# Patient Record
Sex: MALE | Race: WHITE | URBAN - METROPOLITAN AREA
[De-identification: names, ages, dates, MRNs, and addresses within clinical notes are randomized per-mention and may not be internally consistent; named-entity substitution may affect disease eponyms.]

---

## 2017-10-23 NOTE — TELEPHONE ENCOUNTER
"APPT INFO    Date /Time: 10/26/17, 2:25pm    Reason for Appt: Penile tingling and numbness   Ref Provider/Clinic: JEANNIE ELDER   Patient Contact (Y/N) & Call Details: No, referred    Action: Requested imaging from- [facility]     RECORDS CLINIC NAME  (\"None\" if no records ) RECEIVED RECS & IMG? Y/N   (may include other helpful notes)   [facility]         External Clinics: [facility] Received              "

## 2017-10-24 NOTE — TELEPHONE ENCOUNTER
Received Imaging From:     Image Type (x): Disc:__1___    Pacs:_____      Exam Date/Name: [facility] 10/1/14 Comments: sent to film room

## 2017-10-26 ENCOUNTER — PRE VISIT (OUTPATIENT)
Dept: NEUROLOGY | Facility: CLINIC | Age: 32
End: 2017-10-26

## 2017-10-26 ENCOUNTER — OFFICE VISIT (OUTPATIENT)
Dept: NEUROLOGY | Facility: CLINIC | Age: 32
End: 2017-10-26

## 2017-10-26 VITALS — SYSTOLIC BLOOD PRESSURE: 124 MMHG | HEIGHT: 68 IN | DIASTOLIC BLOOD PRESSURE: 70 MMHG | HEART RATE: 61 BPM

## 2017-10-26 DIAGNOSIS — N48.9 PENILE DISORDER: Primary | ICD-10-CM

## 2017-10-26 DIAGNOSIS — N48.9 PENILE DISORDER: ICD-10-CM

## 2017-10-26 LAB
ALBUMIN SERPL-MCNC: 4.7 G/DL (ref 3.4–5)
ALP SERPL-CCNC: 57 U/L (ref 40–150)
ALT SERPL W P-5'-P-CCNC: 44 U/L (ref 0–70)
AST SERPL W P-5'-P-CCNC: 27 U/L (ref 0–45)
BILIRUB DIRECT SERPL-MCNC: 0.2 MG/DL (ref 0–0.2)
BILIRUB SERPL-MCNC: 0.7 MG/DL (ref 0.2–1.3)
PROT SERPL-MCNC: 8.8 G/DL (ref 6.8–8.8)
TSH SERPL DL<=0.005 MIU/L-ACNC: 0.94 MU/L (ref 0.4–4)
VIT B12 SERPL-MCNC: 422 PG/ML (ref 193–986)

## 2017-10-26 PROCEDURE — 86618 LYME DISEASE ANTIBODY: CPT | Performed by: PSYCHIATRY & NEUROLOGY

## 2017-10-26 RX ORDER — DEXTROAMPHETAMINE SACCHARATE, AMPHETAMINE ASPARTATE, DEXTROAMPHETAMINE SULFATE AND AMPHETAMINE SULFATE 7.5; 7.5; 7.5; 7.5 MG/1; MG/1; MG/1; MG/1
TABLET ORAL
COMMUNITY
Start: 2017-09-27

## 2017-10-26 RX ORDER — DEXTROAMPHETAMINE SACCHARATE, AMPHETAMINE ASPARTATE, DEXTROAMPHETAMINE SULFATE AND AMPHETAMINE SULFATE 5; 5; 5; 5 MG/1; MG/1; MG/1; MG/1
TABLET ORAL
COMMUNITY
Start: 2017-09-27

## 2017-10-26 ASSESSMENT — PAIN SCALES - GENERAL: PAINLEVEL: MODERATE PAIN (4)

## 2017-10-26 NOTE — PROGRESS NOTES
"  DEPARTMENT OF NEUROLOGY  Referral for: penile sensory changes  Patient Name:  Judd Roberts  MRN:  [id number]  :  1985  Date of Clinic Visit:  2017  Primary Care Provider:  No primary care provider on file.  Provider Requesting Consult: Andre Roberts    CHIEF COMPLAINT: penis numbness    HISTORY OF PRESENT ILLNESS:  Judd Roberts is a 32 year old male w/ pancreatitis x2, presenting with penis numbness. He first had decrease in the penis sensation along the glands and shaft after getting chiropractic manipulation 2 years ago after a back injury. The numbness is greatest in the glans, less so on the shaft, and is least severe in the scrotum. There is a nonpainful paresthesia associated with this. He has no issues with erection maintenance at this time, he even reports getting his morning erections back.   This is after a period of erectile dysfunction somewhere between 2 and 2-1/2 years ago. He says that he had the erectile dysfunction initially after taking ciprofloxacin for one month to treat prostatitis. The erectile dysfunction resolved but soon afterward he described a change in his semen, saying that it lookd like \"sludge\" and was \"chunky\" when he ejaculated. There was no spontaneous discharge from his penis at that time. 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 This feeling of his testicles being sclerosed is intermittent.   About 2-1/2 years ago before all of that started he had onset of scrotal pain primarily with sitting down. There was no swelling and workup including sexually transmitted infections at that time were negative. About a month later he started to get what he calls \"weak\" erections, primarily in maintaining erection. He denies any problems with orgasm or sensitivity at that time. After this is when he had the episode of prostatitis. " "Ultimately, no diagnosis was made as to the cause of his sensory deficits or erectile dysfunction.   He had a MRI lumbar spine 3 years ago before the onset of the symptoms. I do not have the imaging in front of me but from the report it says there was an L4-L5 left disc protrusion on the root as well as an L5-S1 mid disc bulge.   Review of systems is broadly positive. He endorses getting a numb Botox when sitting down for 30 minutes or more. There is also associated scrotal pain, the left testicle worsen the right. He feels that his pelvic floor muscles, his \"male kegel,\" is weaker. He endorses urinary urgency and says that the neurologist has diagnosed him with a \"weak bladder.\" He does not have any bowel control issues. He describes numbness and paresthesia in his toes and occasionally the heel. He also has numbness and paresthesia in his forearms and hands worse upon waking that resolves after a few minutes. He denies nausea/vomiting, no cranial nerve dysfunction including diplopia, dysphagia, dysarthria, or changes in hearing; he also denies changes in sweating.   His father had prostate cancer but no similar symptoms. 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 He does not smoke tobacco or do drugs, he drinks ethanol rarely. There is no obvious inciting event trauma; he does not ride his bicycle for prolonged periods.    ASSESSMENT AND PLAN:  Mr. Roberts is presenting for approximately 2 years of penis numbness primarily in the S3-S4 distributions. The causes of this kind of presentation are limited. The usual causes are either local structural abnormalities or as part of a systemic process. He has a history of L5-S1 disc herniation and it's possible that this could be worsening or he's developing stenosis near the S3-S4 roots which underlie sensation of the glands and shaft of the penis. This might also explain some of his sensory complaints with respect " "to the pelvic floor. Alternatively, he has symptoms of peripheral neuropathy in his feet and forearms bilaterally. This could be a manifestation of a systemic neuropathic process or a polyradiculopathy. He has absent Achilles reflexes and sensory disturbances on the plantar aspects of his feet which support a process affecting more than the genital nerves. A testosterone issue might explain his erectile dysfunction, but this has resolved and would not explain the sensory decline. We will get imaging and functional nerve studies to better characterize the distribution and quality of neuropathy. We will also obtain some basic labs and refer him to a neuro-urologist.    Plan:  - MRI Lumbar spine w/ and w/o contrast to assess sacral nerve roots  - EMG query polyneuropathy vs radiculopathy w/ possible concomitant carpal tunnel syndrome  - neuropathy labs: OGTT, LFTs, Thyroid panel, Vitamins B1/B6/B12, Vitamin E, MMA, Lyme  - Urology referral for Dr. Juancho Sanchez for male sexual dysfunction    Patient was seen and discussed with Dr. Sen.    Armani Smith MD  Neurology PGY3  [facility]  Pager: [phone]    I saw and examined the patient and discussed the plan of care with the resident.  I agree with the findings, assessment and plan as described by the resident.    Sosa Leija MD    PHYSICAL EXAMINATION:  Vitals: /70  Pulse 61  Ht 1.727 m (5' 8\")  General: Sitting in chair, well-developed male, appears stated age  HEENT: Normocephalic, moist membranes  Cardiac: Normotensive  Pulmonary: Nonlabored on room air  Abdomen: nondistended  Extremities: Warm, no edema  Skin: No rashes, no bruising/bleeding  Psych: Cooperative, appropriate, mood and affect are congruent  Genitourinary: he is uncircumcised; there is no scrotal swelling but there is a large dark vein on the posterior aspect of the scrotum over the left testicle; there is significant reduction in sensation on the glans of the " penis, slightly more in the shaft, and increasing proximally towards the pelvis; there is also significant numbness on the most apical aspect of the medial thighs extending around the perineum to the upper aspect of the buttocks in the S2-S3 distributions.  Neuro:   Mental status: alert and oriented to person, place, and time; language is fluent with intact comprehension and naming   Cranial nerves: PERRL, EOMI with intact smooth pursuit, full visual fields, fundi within normal limits, no facial asymmetry or ptosis, facial sensation intact and symmetric, hearing intact to conversation, tongue and uvula are midline, no hypophonia, no dysarthria   Motor: No abnormal posture, tone, atrophy, or movements/fasciculations.   RIGHT LEFT   Neck flexion 5 5    5 5   FDI 5 5   2nd/3rd digit flexion 5 5   2nd/3rd digit extension 5 5   4th/5th digit flexion 5 5   4th/5th digit extension 5 5   Opponens pollicis 5 5   Biceps 5 5   Triceps 5 5   Deltoid 5 5   Hip flexion 5 5   Knee extension 5 5   Knee flexion 5 5   Dorsiflexion 5 5   Plantar flexion 5 5    Reflexes: absent Babinski. Did not test Bragg.   RIGHT LEFT   Brachioradialis 2+ 2+   Biceps 2+ 2+   Triceps 2+ 2+   Patellar 2+ 2+   Achilles absent absent    Sensory: Light touch is reduced in a gradient distally at mid shin bilaterally in the lower extremities; the plantar aspect of both feet have decreased light touch sensitivity in an unclear distribution but appears maximal and S1; vibration is 7 seconds at the toes bilaterally, 8 seconds at the ankle on the right, and 14 seconds on the left; temperature exhibits a slight reduction in a length dependent manner with the right lower extremity less sensitive than the left extremity; the left lower extremity has an area on the medial aspect of the calf with decreased pinprick sensation, otherwise there is decreased plantar pinprick sensation bilaterally; Tinel's sign absent   Coordination: No dysmetria. No  dysdiadochokinesia. No ataxia.   Gait: Normal width, stride length, turn, and arm swing.    INVESTIGATIONS: Patient had a prior MRI in October 2014. Unfortunately I cannot find these images, but I did review the report from the radiologist in CareEverywhere.    MRI Lumbar spine w/o contrast 10/1/2014:  Impression:  1. At L4-5 a shallow left paracentral broad-based disc protrusion which contacts the traversing left L5 nerve root.  2. At L5-S1 small midline disc bulge and annular tear without stenosis.  3. No evidence of intradural lesion from T11 to the sacrum.    REVIEW OF SYSTEMS: 12-point RoS negative except as per HPI.    No Known Allergies     Current Outpatient Prescriptions   Medication Sig Dispense Refill     amphetamine-dextroamphetamine (ADDERALL) 20 MG per tablet TAKE 2 TABLETS BY MOUTH EVERY MORNING ALONG WITH 30 MG TABLET AT NOON       amphetamine-dextroamphetamine (ADDERALL) 30 MG per tablet TAKE 1 TABLET BY MOUTH DAILY AT NOON       B Complex-C (SURBEX-T PO) Take 1 tablet by mouth       Multiple Vitamins-Minerals (MULTIVITAMIN & MINERAL PO) Take 1 tablet by mouth       History reviewed. No pertinent past medical history.     History reviewed. No pertinent surgical history.  Family History   Problem Relation Age of Onset     Prostate Cancer Father      Social History     Social History     Marital status: Single     Spouse name: N/A     Number of children: N/A     Years of education: N/A     Occupational History     Not on file.     Social History Main Topics     Smoking status: Never Smoker     Smokeless tobacco: Never Used     Alcohol use No     Drug use: Not on file     Sexual activity: Not on file     Other Topics Concern     Not on file     Social History Narrative

## 2017-10-26 NOTE — MR AVS SNAPSHOT
After Visit Summary   10/26/2017    Judd Roberts    MRN: [id number]           Patient Information     Date Of Birth          1985        Visit Information        Provider Department      10/26/2017 2:25 PM Armani Smith MD [facility] Neurology        Today's Diagnoses     Penile disorder    -  1       Follow-ups after your visit        Additional Services     ENDOCRINOLOGY ADULT REFERRAL       Schedule patient specifically with Dr. Jere Yuan - thank you  Endocrinology and Diabetes Clinic [facility] [phone]   [email or web address]      Please be aware that coverage of these services is subject to the terms and limitations of your health insurance plan.  Call member services at your health plan with any benefit or coverage questions.      Please bring the following to your appointment:    >>   Any x-rays, CTs or MRIs which have been performed.  Contact the facility where they were done to arrange for  prior to your scheduled appointment.    >>   List of current medications   >>   This referral request   >>   Any documents/labs given to you for this referral            UROLOGY ADULT REFERRAL       Your provider has referred you to: Dr. Juancho Sanchez at [facility] for Male Sexual Dysfunction, numbness in glans, penis shaft, and scrotum.    Please be aware that coverage of these services is subject to the terms and limitations of your health insurance plan.  Call member services at your health plan with any benefit or coverage questions.      Please bring the following with you to your appointment:    (1) Any X-Rays, CTs or MRIs which have been performed.  Contact the facility where they were done to arrange for  prior to your scheduled appointment.    (2) List of current medications  (3) This referral request   (4) Any documents/labs given to you for this referral                  Your next 10 appointments already  scheduled     Oct 26, 2017  4:15 PM CDT   LAB with  LAB   [facility] ([facility])    [address] [place] [id number] [phone]           Patient must bring picture ID. Patient should be prepared to give a urine specimen  Please do not eat 10-12 hours before your appointment if you are coming in fasting for labs on lipids, cholesterol, or glucose (sugar). Pregnant women should follow their Care Team instructions. Water with medications is okay. Do not drink coffee or other fluids. If you have concerns about taking  your medications, please ask at office or if scheduling via [employer], send a message by clicking on Secure Messaging, Message Your Care Team.            Jan 04, 2018 12:15 PM CST   (Arrive by 12:00 PM)   MR LUMBAR SPINE W/O & W CONTRAST with [address] MRI ([facility])    [address] [place] [id number] [phone]           Take your medicines as usual, unless your doctor tells you not to. Bring a list of your current medicines to your exam (including vitamins, minerals and over-the-counter drugs).  You will be given intravenous contrast for this exam. To prepare:   The day before your exam, drink extra fluids at least six 8-ounce glasses (unless your doctor tells you to restrict your fluids).   Have a blood test (creatinine test) within 30 days of your exam. Go to your clinic or Diagnostic Imaging Department for this test.  The MRI machine uses a strong magnet. Please wear clothes without metal (snaps, zippers). A sweatsuit works well, or we may give you a hospital gown.  Please remove any body piercings and hair extensions before you arrive. You will also remove watches, jewelry, hairpins, wallets, dentures, partial dental plates and hearing aids. You may wear contact lenses, and you may be able to wear your rings. We have a safe place to keep your personal items, but  it is safer to leave them at home.   **IMPORTANT** THE INSTRUCTIONS BELOW ARE ONLY FOR THOSE PATIENTS WHO HAVE BEEN TOLD THEY WILL RECEIVE SEDATION OR GENERAL ANESTHESIA DURING THEIR MRI PROCEDURE:  IF YOU WILL RECEIVE SEDATION (take medicine to help you relax during your exam):   You must get the medicine from your doctor before you arrive. Bring the medicine to the exam. Do not take it at home.   Arrive one hour early. Bring someone who can take you home after the test. Your medicine will make you sleepy. After the exam, you may not drive, take a bus or take a taxi by yourself.   No eating 8 hours before your exam. You may have clear liquids up until 4 hours before your exam. (Clear liquids include water, clear tea, black coffee and fruit juice without pulp.)  IF YOU WILL RECEIVE ANESTHESIA (be asleep for your exam):   Arrive 1 1/2 hours early. Bring someone who can take you home after the test. You may not drive, take a bus or take a taxi by yourself.   No eating 8 hours before your exam. You may have clear liquids up until 4 hours before your exam. (Clear liquids include water, clear tea, black coffee and fruit juice without pulp.)  Please call the Imaging Department at your exam site with any questions.            Jan 04, 2018  1:45 PM CST   (Arrive by 1:30 PM)   EMG with Stephen Bhandari MD   [facility] EMG ([facility])    [address] [place] [id number] [phone]           Do not use lotions or creams on the area to be tested. If you are on blood thinners (Warfarin, Coumadin, Lovenox, etc), please contact your primary care physician to check if it is safe to stop them 3 days prior to testing. If you have anxiety, please check with your referring physician to obtain anti-anxiety medication for the procedure.            Jan 04, 2018  3:20 PM CST   (Arrive by 3:05 PM)   Return Visit with Juancho Sanchez MD   [facility] Urology and [facility] for  Prostate and Urologic Cancers ([facility])    [address] [facility]  [address] [facility] [id number] [phone]              Future tests that were ordered for you today     Open Future Orders        Priority Expected Expires Ordered    MR Lumbar Spine w/o & w Contrast Routine  10/26/2018 10/26/2017    EMG Routine  10/26/2018 10/26/2017    Vitamin B1 plasma Routine  10/27/2018 10/26/2017    Vitamin B6 Routine  10/27/2018 10/26/2017    Vitamin E Routine  10/27/2018 10/26/2017    Methylmalonic acid Routine  10/27/2018 10/26/2017            Who to contact     Please call your clinic at [phone] to:    Ask questions about your health    Make or cancel appointments    Discuss your medicines    Learn about your test results    Speak to your doctor   If you have compliments or concerns about an experience at your clinic, or if you wish to file a complaint, please contact [facility] Patient Relations at [phone] or email us at [email or web address]         Additional Information About Your Visit        [employer] Information     [employer] gives you secure access to your electronic health record. If you see a primary care provider, you can also send messages to your care team and make appointments. If you have questions, please call your primary care clinic.  If you do not have a primary care provider, please call [phone] and they will assist you.      [employer] is an electronic gateway that provides easy, online access to your medical records. With [employer], you can request a clinic appointment, read your test results, renew a prescription or communicate with your care team.     To access your existing account, please contact your [facility] Clinic or call [phone] for assistance.        Care EveryWhere ID     This is your Care EveryWhere ID. This could be used by other organizations to access your [facility]  "records  [id number]        Your Vitals Were     Pulse Height                61 1.727 m (5' 8\")           Blood Pressure from Last 3 Encounters:   10/26/17 124/70   02/05/16 136/84    Weight from Last 3 Encounters:   02/05/16 87.1 kg (192 lb)              We Performed the Following     ENDOCRINOLOGY ADULT REFERRAL     Glucose Fasting and 2hr ([facility])     Hepatic panel (Albumin, ALT, AST, Bili, Alk Phos, TP)     Lyme Disease Melisa with reflex to WB Serum     TSH with free T4 reflex     UROLOGY ADULT REFERRAL     Vitamin B12        Primary Care Provider    None Specified       No primary provider on file.        Equal Access to Services     [facility]: Haddante Kang, chaz be, elijah navas, holly macdonald . So [facility] [phone].    ATENCIÓN: Si habla español, tiene a grimm disposición servicios gratuitos de asistencia lingüística. Llame al [phone].    We comply with applicable federal civil rights laws and [place] laws. We do not discriminate on the basis of race, color, national origin, age, disability, sex, sexual orientation, or gender identity.            Thank you!     Thank you for choosing [facility] NEUROLOGY  for your care. Our goal is always to provide you with excellent care. Hearing back from our patients is one way we can continue to improve our services. Please take a few minutes to complete the written survey that you may receive in the mail after your visit with us. Thank you!             Your Updated Medication List - Protect others around you: Learn how to safely use, store and throw away your medicines at www.disposemymeds.org.          This list is accurate as of: 10/26/17  4:13 PM.  Always use your most recent med list.                   Brand Name Dispense Instructions for use Diagnosis    * amphetamine-dextroamphetamine 20 MG per tablet    ADDERALL     TAKE 2 TABLETS BY MOUTH EVERY MORNING ALONG WITH 30 MG TABLET AT NOON        * " amphetamine-dextroamphetamine 30 MG per tablet    ADDERALL     TAKE 1 TABLET BY MOUTH DAILY AT NOON        MULTIVITAMIN & MINERAL PO      Take 1 tablet by mouth        SURBEX-T PO      Take 1 tablet by mouth        * Notice:  This list has 2 medication(s) that are the same as other medications prescribed for you. Read the directions carefully, and ask your doctor or other care provider to review them with you.

## 2017-10-26 NOTE — LETTER
"10/26/2017       RE: Judd Roberts  [id number] [address] [address]     Dear Colleague,    Thank you for referring your patient, Judd Roberts, to the [facility] NEUROLOGY at [facility]. Please see a copy of my visit note below.      DEPARTMENT OF NEUROLOGY  Referral for: penile sensory changes  Patient Name:  Judd Roberts  MRN:  [id number]  :  1985  Date of Clinic Visit:  2017  Primary Care Provider:  No primary care provider on file.  Provider Requesting Consult: Andre Roberts    CHIEF COMPLAINT: penis numbness    HISTORY OF PRESENT ILLNESS:  Judd Roberts is a 32 year old male w/ pancreatitis x2, presenting with penis numbness. He first had decrease in the penis sensation along the glands and shaft after getting chiropractic manipulation 2 years ago after a back injury. The numbness is greatest in the glans, less so on the shaft, and is least severe in the scrotum. There is a nonpainful paresthesia associated with this. He has no issues with erection maintenance at this time, he even reports getting his morning erections back.   This is after a period of erectile dysfunction somewhere between 2 and 2-1/2 years ago. He says that he had the erectile dysfunction initially after taking ciprofloxacin for one month to treat prostatitis. The erectile dysfunction resolved but soon afterward he described a change in his semen, saying that it lookd like \"sludge\" and was \"chunky\" when he ejaculated. There was no spontaneous discharge from his penis at that time. 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 This feeling of his testicles being sclerosed is intermittent.   About 2-1/2 years ago before all of that started he had onset of scrotal pain primarily with sitting down. There was no swelling and workup including " "sexually transmitted infections at that time were negative. About a month later he started to get what he calls \"weak\" erections, primarily in maintaining erection. He denies any problems with orgasm or sensitivity at that time. After this is when he had the episode of prostatitis. Ultimately, no diagnosis was made as to the cause of his sensory deficits or erectile dysfunction.   He had a MRI lumbar spine 3 years ago before the onset of the symptoms. I do not have the imaging in front of me but from the report it says there was an L4-L5 left disc protrusion on the root as well as an L5-S1 mid disc bulge.   Review of systems is broadly positive. He endorses getting a numb Botox when sitting down for 30 minutes or more. There is also associated scrotal pain, the left testicle worsen the right. He feels that his pelvic floor muscles, his \"male kegel,\" is weaker. He endorses urinary urgency and says that the neurologist has diagnosed him with a \"weak bladder.\" He does not have any bowel control issues. He describes numbness and paresthesia in his toes and occasionally the heel. He also has numbness and paresthesia in his forearms and hands worse upon waking that resolves after a few minutes. He denies nausea/vomiting, no cranial nerve dysfunction including diplopia, dysphagia, dysarthria, or changes in hearing; he also denies changes in sweating.   His father had prostate cancer but no similar symptoms. 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 He does not smoke tobacco or do drugs, he drinks ethanol rarely. There is no obvious inciting event trauma; he does not ride his bicycle for prolonged periods.    ASSESSMENT AND PLAN:  Mr. Roberts is presenting for approximately 2 years of penis numbness primarily in the S3-S4 distributions. The causes of this kind of presentation are limited. The usual causes are either local structural abnormalities or as part of a " "systemic process. He has a history of L5-S1 disc herniation and it's possible that this could be worsening or he's developing stenosis near the S3-S4 roots which underlie sensation of the glands and shaft of the penis. This might also explain some of his sensory complaints with respect to the pelvic floor. Alternatively, he has symptoms of peripheral neuropathy in his feet and forearms bilaterally. This could be a manifestation of a systemic neuropathic process or a polyradiculopathy. He has absent Achilles reflexes and sensory disturbances on the plantar aspects of his feet which support a process affecting more than the genital nerves. A testosterone issue might explain his erectile dysfunction, but this has resolved and would not explain the sensory decline. We will get imaging and functional nerve studies to better characterize the distribution and quality of neuropathy. We will also obtain some basic labs and refer him to a neuro-urologist.    Plan:  - MRI Lumbar spine w/ and w/o contrast to assess sacral nerve roots  - EMG query polyneuropathy vs radiculopathy w/ possible concomitant carpal tunnel syndrome  - neuropathy labs: OGTT, LFTs, Thyroid panel, Vitamins B1/B6/B12, Vitamin E, MMA, Lyme  - Urology referral for Dr. Juancho Sanchez for male sexual dysfunction    Patient was seen and discussed with Dr. Sen.    Armani Smith MD  Neurology PGY3  [facility]  Pager: [phone]    PHYSICAL EXAMINATION:  Vitals: /70  Pulse 61  Ht 1.727 m (5' 8\")  General: Sitting in chair, well-developed male, appears stated age  HEENT: Normocephalic, moist membranes  Cardiac: Normotensive  Pulmonary: Nonlabored on room air  Abdomen: nondistended  Extremities: Warm, no edema  Skin: No rashes, no bruising/bleeding  Psych: Cooperative, appropriate, mood and affect are congruent  Genitourinary: he is uncircumcised; there is no scrotal swelling but there is a large dark vein on the posterior aspect of " the scrotum over the left testicle; there is significant reduction in sensation on the glans of the penis, slightly more in the shaft, and increasing proximally towards the pelvis; there is also significant numbness on the most apical aspect of the medial thighs extending around the perineum to the upper aspect of the buttocks in the S2-S3 distributions.  Neuro:   Mental status: alert and oriented to person, place, and time; language is fluent with intact comprehension and naming   Cranial nerves: PERRL, EOMI with intact smooth pursuit, full visual fields, fundi within normal limits, no facial asymmetry or ptosis, facial sensation intact and symmetric, hearing intact to conversation, tongue and uvula are midline, no hypophonia, no dysarthria   Motor: No abnormal posture, tone, atrophy, or movements/fasciculations.   RIGHT LEFT   Neck flexion 5 5    5 5   FDI 5 5   2nd/3rd digit flexion 5 5   2nd/3rd digit extension 5 5   4th/5th digit flexion 5 5   4th/5th digit extension 5 5   Opponens pollicis 5 5   Biceps 5 5   Triceps 5 5   Deltoid 5 5   Hip flexion 5 5   Knee extension 5 5   Knee flexion 5 5   Dorsiflexion 5 5   Plantar flexion 5 5    Reflexes: absent Babinski. Did not test Bragg.   RIGHT LEFT   Brachioradialis 2+ 2+   Biceps 2+ 2+   Triceps 2+ 2+   Patellar 2+ 2+   Achilles absent absent    Sensory: Light touch is reduced in a gradient distally at mid shin bilaterally in the lower extremities; the plantar aspect of both feet have decreased light touch sensitivity in an unclear distribution but appears maximal and S1; vibration is 7 seconds at the toes bilaterally, 8 seconds at the ankle on the right, and 14 seconds on the left; temperature exhibits a slight reduction in a length dependent manner with the right lower extremity less sensitive than the left extremity; the left lower extremity has an area on the medial aspect of the calf with decreased pinprick sensation, otherwise there is decreased plantar  pinprick sensation bilaterally; Tinel's sign absent   Coordination: No dysmetria. No dysdiadochokinesia. No ataxia.   Gait: Normal width, stride length, turn, and arm swing.    INVESTIGATIONS: Patient had a prior MRI in October 2014. Unfortunately I cannot find these images, but I did review the report from the radiologist in [facility].    MRI Lumbar spine w/o contrast 10/1/2014:  Impression:  1. At L4-5 a shallow left paracentral broad-based disc protrusion which contacts the traversing left L5 nerve root.  2. At L5-S1 small midline disc bulge and annular tear without stenosis.  3. No evidence of intradural lesion from T11 to the sacrum.    REVIEW OF SYSTEMS: 12-point RoS negative except as per HPI.    No Known Allergies     Current Outpatient Prescriptions   Medication Sig Dispense Refill     amphetamine-dextroamphetamine (ADDERALL) 20 MG per tablet TAKE 2 TABLETS BY MOUTH EVERY MORNING ALONG WITH 30 MG TABLET AT NOON       amphetamine-dextroamphetamine (ADDERALL) 30 MG per tablet TAKE 1 TABLET BY MOUTH DAILY AT NOON       B Complex-C (SURBEX-T PO) Take 1 tablet by mouth       Multiple Vitamins-Minerals (MULTIVITAMIN & MINERAL PO) Take 1 tablet by mouth       History reviewed. No pertinent past medical history.     History reviewed. No pertinent surgical history.  Family History   Problem Relation Age of Onset     Prostate Cancer Father      Social History     Social History     Marital status: Single     Spouse name: N/A     Number of children: N/A     Years of education: N/A     Occupational History     Not on file.     Social History Main Topics     Smoking status: Never Smoker     Smokeless tobacco: Never Used     Alcohol use No     Drug use: Not on file     Sexual activity: Not on file     Other Topics Concern     Not on file     Social History Narrative       Again, thank you for allowing me to participate in the care of your patient.      Sincerely,    Armani Smith MD

## 2017-10-26 NOTE — NURSING NOTE
Chief Complaint   Patient presents with     Consult     Penile tingling & numbness    Juan Scott CMA

## 2017-10-27 LAB — B BURGDOR IGG+IGM SER QL: 0.11 (ref 0–0.89)

## 2017-10-29 LAB — METHYLMALONATE SERPL-SCNC: 0.14 UMOL/L (ref 0–0.4)

## 2017-10-30 LAB
A-TOCOPHEROL VIT E SERPL-MCNC: 8.8 MG/L (ref 5.5–18)
BETA+GAMMA TOCOPHEROL SERPL-MCNC: 1 MG/L (ref 0–6)

## 2017-10-31 LAB
VIT B1 SERPL-MCNC: 5 NMOL/L (ref 4–15)
VIT B6 SERPL-MCNC: 76 NMOL/L (ref 20–125)

## 2017-12-29 ENCOUNTER — PRE VISIT (OUTPATIENT)
Dept: UROLOGY | Facility: CLINIC | Age: 32
End: 2017-12-29

## 2017-12-29 NOTE — TELEPHONE ENCOUNTER
Patient with history of penile disorder coming in for consult with Dr. Sanchez. Patient chart reviewed, no need for call, all records available and ready for appointment.

## 2018-01-04 ENCOUNTER — OFFICE VISIT (OUTPATIENT)
Dept: NEUROLOGY | Facility: CLINIC | Age: 33
End: 2018-01-04
Payer: COMMERCIAL

## 2018-01-04 ENCOUNTER — RADIANT APPOINTMENT (OUTPATIENT)
Dept: MRI IMAGING | Facility: CLINIC | Age: 33
End: 2018-01-04
Attending: PSYCHIATRY & NEUROLOGY
Payer: COMMERCIAL

## 2018-01-04 ENCOUNTER — OFFICE VISIT (OUTPATIENT)
Dept: UROLOGY | Facility: CLINIC | Age: 33
End: 2018-01-04
Payer: COMMERCIAL

## 2018-01-04 VITALS
WEIGHT: 175 LBS | HEIGHT: 68 IN | SYSTOLIC BLOOD PRESSURE: 129 MMHG | DIASTOLIC BLOOD PRESSURE: 70 MMHG | HEART RATE: 50 BPM | BODY MASS INDEX: 26.52 KG/M2

## 2018-01-04 DIAGNOSIS — N48.9 PENIS DISORDER: Primary | ICD-10-CM

## 2018-01-04 DIAGNOSIS — N48.9 PENILE DISORDER: ICD-10-CM

## 2018-01-04 DIAGNOSIS — R20.2 PARESTHESIAS: Primary | ICD-10-CM

## 2018-01-04 RX ORDER — GADOBUTROL 604.72 MG/ML
7.5 INJECTION INTRAVENOUS ONCE
Status: COMPLETED | OUTPATIENT
Start: 2018-01-04 | End: 2018-01-04

## 2018-01-04 RX ADMIN — GADOBUTROL 7.5 ML: 604.72 INJECTION INTRAVENOUS at 12:50

## 2018-01-04 ASSESSMENT — PAIN SCALES - GENERAL: PAINLEVEL: NO PAIN (0)

## 2018-01-04 NOTE — LETTER
1/4/2018       RE: Judd Roberts  [id number] [address] [address]     Dear Colleague,    Thank you for referring your patient, Judd Roberts, to the [facility] EMG at [facility]. Please see a copy of my visit note below.        [facility]  Electrodiagnostic Laboratory    Nerve Conduction & EMG Report          Patient:       Judd Roberts  Patient ID:    [id number]  Gender:        Male  YOB: 1985  Age:           32 Years 2 Months      Referring Physician: Sosa Leija MD  Cc: Armani Smith MD    History & Examination:    32 year old man reporting paresthesias from his gluteal region to posterior thighs, calves, and feet, and intermittent penile paresthesias. His symptoms are reliably reproduced by prolonged sitting and relieved by standing/walking. He also report intermittent numbness especially at the ulnar aspect of both hands noted upon waking up in the morning, which typically lasts seconds to a few minutes. Query: lumbosacral radiculopathies, polyneuropathy, entrapment neuropathies of upper extremities.    Techniques: Motor and sensory conduction studies were done with surface recording electrodes. Temperature was monitored and recorded throughout the study. Upper extremities were maintained at a temperature of 32 degrees Centigrade or higher.  Lower extremities were maintained at a temperature of 31degrees Centigrade or higher. EMG was done with a concentric needle electrode.     Results:    Right median, ulnar, bilateral sural, and right superficial peroneal antidromic sensory NCSs were normal. Right median and ulnar orthodromic mixed NCSs obtained with stimulation at the palm were normal. Right median, ulnar (recorded from ADM and FDI), tibial, and bilateral deep peroneal motor NCSs were normal. Right tibial and ulnar F-wave latencies were normal. EMG of bilateral TA, medial gastrocnemius, vastus lateralis, biceps  femoris (short head) and gluteus medius muscles was normal.     Interpretation:    Normal study. No electrodiagnostic evidence of bilateral lumbosacral radiculopathies, plexopathy, polyneuropathy, right carpal tunnel syndrome, or right ulnar neuropathy, as clinically queried.    EMG Physician:    Stephen Bhandari MD       Sensory NCS      Nerve / Sites Rec. Site Onset Peak Ref. NP Amp Ref. PP Amp Dist Gurjit Ref. Temp     ms ms ms  V  V  V cm m/s m/s  C   R MEDIAN - Dig II Anti      Wrist Dig II 2.29 3.18  34.1 10.0 56.5 14 61.1 48.0 29.4   R ULNAR - Dig V Anti      Wrist Dig V 2.24 3.02  26.1 8.0 47.3 12.5 55.8 48.0 29.4   R SURAL - Lat Mall      Calf Ankle 3.13 3.75  30.9 8.0 30.0 14 44.8 38.0 28.8   L SURAL - Lat Mall      Calf Ankle 2.86 3.59  26.3 8.0 35.4 14 48.9 38.0 31.1   R SUP PERONEAL      Lat Leg Shin 2.60 3.33  19.6  21.9 12.5 48.0 38.0 28.8   R MEDIAN - Ulnar - Palmar      Median Wrist 1.41 1.82 2.40 48.7  58.5 8 56.9  32.5      Ulnar Wrist 1.41 1.93 2.40 26.5  39.2 8 56.9  32.6       Motor NCS      Nerve / Sites Rec. Site Lat Ref. Amp Ref. Rel Amp Dist Gurjit Ref. Dur. Area Temp.     ms ms mV mV % cm m/s m/s ms %  C   R MEDIAN - APB      Wrist APB 3.54 4.40 9.5 5.0 100 8   6.09 100 32.2      Elbow APB 7.19  4.1  43.6 23 63.1 48.0 6.20 39.8 31.7      stim ulnar elbow APB 8.13  1.0  10.3    4.95 1230 31.7   R ULNAR - ADM      Wrist ADM 2.86 3.50 10.4 5.0 100 8   5.63 100 30.6      B.Elbow ADM 6.67  10.2  97.8 23 60.5 48.0 5.68 107 30.7      A.Elbow ADM 8.18  10.2  98.3 9 59.6 48.0 5.63 100 30.8   R DEEP PERONEAL - EDB      Ankle EDB 4.53 6.00 8.0 2.5 100 8   6.41 100 28.9      FibHead EDB 11.25  7.2  90.5 38 56.6 38.0 7.50 110 29      Pop Fos EDB 12.55  6.5  82.1 8 61.4 38.0 7.66 106 29   L DEEP PERONEAL - EDB 60      Ankle EDB 5.36 6.00 6.2 2.0 100 8   7.08 100 31.1   R TIBIAL - AH      Ankle AH 3.54 6.00 11.6 4.0 100 8   7.03 100 28.9      Pop Fos AH 12.24  7.6  65.4 41 47.1 38.0 7.14 77.8 28.8   R  ULNAR - FDI      Wrist FDI 4.11  11.5  100    5.21 100 30.7      B.Elbow FDI 8.18  11.6  101 23 56.6  5.05 104 30.6      A.Elbow FDI 9.74  10.7  93.2 9 57.6  5.05 98.8 30.4       F  Wave      Nerve Min F Lat Max F Lat Mean FLat Temp.    ms ms ms  C   R TIBIAL 49.06 50.63 49.84 28.5   R ULNAR 25.57 28.96 28.09 30.2       EMG Summary Table     Spontaneous MUAP Recruitment    IA Fib PSW Fasc H.F. Amp Dur. PPP Pattern   R. TIB ANTERIOR N None None None None N N N N   R. GASTROCN (MED) N None None None None N N N N   R. VAST LATERALIS N None None None None N N N N   R. BIC FEM (S HEAD) N None None None None N N N N   R. GLUTEUS MED N None None None None N N N N   L. TIB ANTERIOR N None None None None N N N N   L. GASTROCN (MED) N None None None None N N N N   L. VAST LATERALIS N None None None None N N N N   L. BIC FEM (S HEAD) N None None None None N N N N   L. GLUTEUS MED N None None None None N N N N                                        Again, thank you for allowing me to participate in the care of your patient.      Sincerely,    Stephen Bhandari MD

## 2018-01-04 NOTE — PROGRESS NOTES
"We are pleased to see Mr. Judd Roberts in consultation at the request of Dr. Simth for the evaluation of chief complaint listed below    Chief Complaint:    Penile numbness         History of Present Illness:   Judd Roberts is a(n) 32 year old male w/ PMH of pancreatitis and urologic history of testicular pain and penile numbness that started roughly three years ago with an episode of acute prostatitis that manifested as bilateral testicular pain. Around that time he had a back injury - felt a \"pop\" while lifting a heavy object and pulled his back. He was treated with empiric antibiotics for the prostatitis with little symptom improvement. He then started developing bilateral testicular pain in 2014 that transformed to numbness of the penile shaft, along with weaker erections and occasional testicular numbness. He has seen 3 urologists in the past for this issue, including Dr. Luke Roberts in 2016. The first two urologists had the patient on a trial of antibiotics for presumed prostatitis (unsuccessful), did STI workup (neg) and also did a cystoscopy, which was negative. A testicular U/S in Sep 2014 was also unremarkable except for a left varicocele. Dr. Roberts saw the patient in Feb 2016 and noted that the patient had a hx of L4-L5 disk protrusion on MRI spine from Sep 2014, for which patient had been treated by a chiropractor, and noted a left varicocele on exam. He referred patient to neurology to further evaluate his spine, and they saw the patient in Oct 2017. At the time he endorsed testicular pain again also with numbness and parasthesia in the lower extremities and the penis, with otherwise normal erectons. Neurology wanted to perform imaging and functional nerve studies to further assess the patient's complaints with a repeat MRI lumbar spine. Nerve condution study was done today and was wnl. MRI spine was also done today and showed:    Impression:   1. Mild multilevel lumbar degenerative " "disease, most prominent being  the L4-5 disc degenerative disease where a herniation may abut the  left L5 nerve root within the lateral recess and recommend correlation  for left L5 radiculopathy symptoms.  2. Visualized lumbar sacral plexus down to the level of S3 appears  unremarkable however, penile innervation (given clinical history) can  involve S4, which is not covered on imaging., Also, the distal  portions of the sacral plexus are not visualized which are better  visualized on lumbar sacral plexus imaging.     Today he reports the same issues and that his penile numbness now \"involves 90% of the penis,\" especially when it's flaccid. He will also experience paraesthesias in his hands as well as his lower extremities. He has noted absence of nocturnal tumescence over the past 3 years. Denies fevers/chills/dysuria/hematuria.           Past Medical History:   History reviewed. No pertinent past medical history.         Past Surgical History:   History reviewed. No pertinent surgical history.         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         Allergies:   No Known Allergies         Medications:     Current Outpatient Prescriptions   Medication Sig     amphetamine-dextroamphetamine (ADDERALL) 20 MG per tablet TAKE 2 TABLETS BY MOUTH EVERY MORNING ALONG WITH 30 MG TABLET AT NOON     amphetamine-dextroamphetamine (ADDERALL) 30 MG per tablet TAKE 1 TABLET BY MOUTH DAILY AT NOON     B Complex-C (SURBEX-T PO) Take 1 tablet by mouth     Multiple Vitamins-Minerals (MULTIVITAMIN & MINERAL PO) Take 1 tablet by mouth     No current facility-administered medications for this visit.             REVIEW OF SYSTEMS:    See HPI for pertinent details.  Remainder of 10-point ROS negative.         " "PHYSICAL EXAM   Ht 1.727 m (5' 8\")  GENERAL: No acute distress. Well nourished.   HEENT:  Sclerae anicteric.  Conjunctivae pink.  Moist mucous membranes.  NECK:  Supple.  No lymphadenopathy.  CARDIAC:  Regular rate and rhythm.  LUNGS:  Non-labored breathing  BACK:  No costovertebral tenderness.  ABDOMEN: Soft, non-tender, no surgical scars, no organomegaly, non-tender.  :  Phallus circumcised, meatus adequate, no plaques palpated. Testes descended bilaterally, no intratesticular masses.  Epididymes non-tender.  Left varicocele noted. No inguinal hernias.  SKIN: No rashes.  Dry.     EXTREMITIES:  Warm, well perfused.  No lower extremity edema bilaterally.  NEURO: normal gait, no focal deficits.           LABS AND IMAGING:   As above          ASSESSMENT:   Judd Roberts is a 32 year old male who presents for urologic history of testicular pain and penile numbness that started roughly three years ago, at the same time of an episode of prostatitis and back injury. He has been evaluated by 3 urologists in the pasts with negative workup except for a left varicocele. He was also referred to neurology for workup of a known hx of lumbar disk protrusion. EMG and nerve conduction studies were negative today but the MRI of the spine from today did not visualize the sacral plexus down to the areas that would supply penile sensation. Exam today is unremarkable             PLAN:   - Refer back to neurology; he has a scheduled appointment with them in February. We did mention to patient that his MRI today appears to have not included the areas of the sacral plexus that would affect penile sensation, and so he could ask neurology to see if this MRI is sufficient     Ellyn Green MD MS  Urology Resident    Patient was seen and examined with Dr. Sanchez      I saw and examined the patient with the resident today.  I agree with the resident note and plan of care as above.   Unclear etiology of decreased penile sensation.  It " appears he may even have a polyneuropathy, given decreased sensations in the fingers and toes as well.  Common diagnoses such as hypothyroidism, Lyme's disease, and vitamin deficiencies have been excluded.  I advised him I do not see anything specifically wrong with the penis physically, I think this is more of a neurologic diagnosis.  He does have follow-up with neurology.  Follow-up as needed    Juancho Sanchez MD  Urology Staff

## 2018-01-04 NOTE — PROGRESS NOTES
[facility]  Electrodiagnostic Laboratory    Nerve Conduction & EMG Report          Patient:       Judd Roberts  Patient ID:    [id number]  Gender:        Male  YOB: 1985  Age:           32 Years 2 Months      Referring Physician: Sosa Leija MD  Cc: Armani Smith MD    History & Examination:    32 year old man reporting paresthesias from his gluteal region to posterior thighs, calves, and feet, and intermittent penile paresthesias. His symptoms are reliably reproduced by prolonged sitting and relieved by standing/walking. He also report intermittent numbness especially at the ulnar aspect of both hands noted upon waking up in the morning, which typically lasts seconds to a few minutes. Query: lumbosacral radiculopathies, polyneuropathy, entrapment neuropathies of upper extremities.    Techniques: Motor and sensory conduction studies were done with surface recording electrodes. Temperature was monitored and recorded throughout the study. Upper extremities were maintained at a temperature of 32 degrees Centigrade or higher.  Lower extremities were maintained at a temperature of 31degrees Centigrade or higher. EMG was done with a concentric needle electrode.     Results:    Right median, ulnar, bilateral sural, and right superficial peroneal antidromic sensory NCSs were normal. Right median and ulnar orthodromic mixed NCSs obtained with stimulation at the palm were normal. Right median, ulnar (recorded from ADM and FDI), tibial, and bilateral deep peroneal motor NCSs were normal. Right tibial and ulnar F-wave latencies were normal. EMG of bilateral TA, medial gastrocnemius, vastus lateralis, biceps femoris (short head) and gluteus medius muscles was normal.     Interpretation:    Normal study. No electrodiagnostic evidence of bilateral lumbosacral radiculopathies, plexopathy, polyneuropathy, right carpal tunnel syndrome, or right ulnar neuropathy, as clinically  queried.    EMG Physician:    Stephen Bhandari MD       Sensory NCS      Nerve / Sites Rec. Site Onset Peak Ref. NP Amp Ref. PP Amp Dist Gurjit Ref. Temp     ms ms ms  V  V  V cm m/s m/s  C   R MEDIAN - Dig II Anti      Wrist Dig II 2.29 3.18  34.1 10.0 56.5 14 61.1 48.0 29.4   R ULNAR - Dig V Anti      Wrist Dig V 2.24 3.02  26.1 8.0 47.3 12.5 55.8 48.0 29.4   R SURAL - Lat Mall      Calf Ankle 3.13 3.75  30.9 8.0 30.0 14 44.8 38.0 28.8   L SURAL - Lat Mall      Calf Ankle 2.86 3.59  26.3 8.0 35.4 14 48.9 38.0 31.1   R SUP PERONEAL      Lat Leg Shin 2.60 3.33  19.6  21.9 12.5 48.0 38.0 28.8   R MEDIAN - Ulnar - Palmar      Median Wrist 1.41 1.82 2.40 48.7  58.5 8 56.9  32.5      Ulnar Wrist 1.41 1.93 2.40 26.5  39.2 8 56.9  32.6       Motor NCS      Nerve / Sites Rec. Site Lat Ref. Amp Ref. Rel Amp Dist Gurjit Ref. Dur. Area Temp.     ms ms mV mV % cm m/s m/s ms %  C   R MEDIAN - APB      Wrist APB 3.54 4.40 9.5 5.0 100 8   6.09 100 32.2      Elbow APB 7.19  4.1  43.6 23 63.1 48.0 6.20 39.8 31.7      stim ulnar elbow APB 8.13  1.0  10.3    4.95 1230 31.7   R ULNAR - ADM      Wrist ADM 2.86 3.50 10.4 5.0 100 8   5.63 100 30.6      B.Elbow ADM 6.67  10.2  97.8 23 60.5 48.0 5.68 107 30.7      A.Elbow ADM 8.18  10.2  98.3 9 59.6 48.0 5.63 100 30.8   R DEEP PERONEAL - EDB      Ankle EDB 4.53 6.00 8.0 2.5 100 8   6.41 100 28.9      FibHead EDB 11.25  7.2  90.5 38 56.6 38.0 7.50 110 29      Pop Fos EDB 12.55  6.5  82.1 8 61.4 38.0 7.66 106 29   L DEEP PERONEAL - EDB 60      Ankle EDB 5.36 6.00 6.2 2.0 100 8   7.08 100 31.1   R TIBIAL - AH      Ankle AH 3.54 6.00 11.6 4.0 100 8   7.03 100 28.9      Pop Fos AH 12.24  7.6  65.4 41 47.1 38.0 7.14 77.8 28.8   R ULNAR - FDI      Wrist FDI 4.11  11.5  100    5.21 100 30.7      B.Elbow FDI 8.18  11.6  101 23 56.6  5.05 104 30.6      A.Elbow FDI 9.74  10.7  93.2 9 57.6  5.05 98.8 30.4       F  Wave      Nerve Min F Lat Max F Lat Mean FLat Temp.    ms ms ms  C   R TIBIAL 49.06 50.63  49.84 28.5   R ULNAR 25.57 28.96 28.09 30.2       EMG Summary Table     Spontaneous MUAP Recruitment    IA Fib PSW Fasc H.F. Amp Dur. PPP Pattern   R. TIB ANTERIOR N None None None None N N N N   R. GASTROCN (MED) N None None None None N N N N   R. VAST LATERALIS N None None None None N N N N   R. BIC FEM (S HEAD) N None None None None N N N N   R. GLUTEUS MED N None None None None N N N N   L. TIB ANTERIOR N None None None None N N N N   L. GASTROCN (MED) N None None None None N N N N   L. VAST LATERALIS N None None None None N N N N   L. BIC FEM (S HEAD) N None None None None N N N N   L. GLUTEUS MED N None None None None N N N N

## 2018-01-04 NOTE — NURSING NOTE
"Chief Complaint   Patient presents with     Consult     \"penile disorder\"       Blood pressure 129/70, pulse 50, height 1.727 m (5' 8\"), weight 79.4 kg (175 lb). Body mass index is 26.61 kg/(m^2).    There is no problem list on file for this patient.      No Known Allergies    Current Outpatient Prescriptions   Medication Sig Dispense Refill     amphetamine-dextroamphetamine (ADDERALL) 20 MG per tablet TAKE 2 TABLETS BY MOUTH EVERY MORNING ALONG WITH 30 MG TABLET AT NOON       amphetamine-dextroamphetamine (ADDERALL) 30 MG per tablet TAKE 1 TABLET BY MOUTH DAILY AT NOON       B Complex-C (SURBEX-T PO) Take 1 tablet by mouth       Multiple Vitamins-Minerals (MULTIVITAMIN & MINERAL PO) Take 1 tablet by mouth         Social History   Substance Use Topics     Smoking status: Never Smoker     Smokeless tobacco: Never Used     Alcohol use No       Jocelyn Darden LPN  1/4/2018  3:19 PM    "

## 2018-01-04 NOTE — MR AVS SNAPSHOT
After Visit Summary   1/4/2018    Judd Roberts    MRN: [id number]           Patient Information     Date Of Birth          1985        Visit Information        Provider Department      1/4/2018 3:20 PM Juancho Sanchez MD [facility] Urology and [facility] for Prostate and Urologic Cancers        Today's Diagnoses     Penis disorder    -  1       Follow-ups after your visit        Follow-up notes from your care team     Return if symptoms worsen or fail to improve.      Your next 10 appointments already scheduled     Feb 15, 2018  4:10 PM CST   (Arrive by 3:55 PM)   Return Visit with Armani Smith MD   [facility] Neurology ([facility])    [address]  3rd Floor  [place] [address] [phone]              Future tests that were ordered for you today     Open Future Orders        Priority Expected Expires Ordered    Needle EMG Each Extremity w/Paraspinal Area Complete (17128) Routine  1/4/2019 1/4/2018            Who to contact     Please call your clinic at [phone] to:    Ask questions about your health    Make or cancel appointments    Discuss your medicines    Learn about your test results    Speak to your doctor   If you have compliments or concerns about an experience at your clinic, or if you wish to file a complaint, please contact [facility] Patient Relations at [phone] or email us at [email or web address]         Additional Information About Your Visit        MyChart Information     [employer] gives you secure access to your electronic health record. If you see a primary care provider, you can also send messages to your care team and make appointments. If you have questions, please call your primary care clinic.  If you do not have a primary care provider, please call [phone] and they will assist you.      [employer] is an electronic gateway that provides easy, online access to your medical  "records. With [employer], you can request a clinic appointment, read your test results, renew a prescription or communicate with your care team.     To access your existing account, please contact your [facility] or call [phone] for assistance.        Care EveryWhere ID     This is your Care EveryWhere ID. This could be used by other organizations to access your [place] medical records  [id number]        Your Vitals Were     Pulse Height BMI (Body Mass Index)             50 1.727 m (5' 8\") 26.61 kg/m2          Blood Pressure from Last 3 Encounters:   01/04/18 129/70   10/26/17 124/70   02/05/16 136/84    Weight from Last 3 Encounters:   01/04/18 79.4 kg (175 lb)   02/05/16 87.1 kg (192 lb)              Today, you had the following     No orders found for display       Primary Care Provider    None Specified       No primary provider on file.        Equal Access to Services     [facility]: Hadii cale Kang, wasonu be, elijah kaalmada eloise, holly macdonald . So [facility] [phone].    ATENCIÓN: Si habla español, tiene a grimm disposición servicios gratuitos de asistencia lingüística. Dannaame al [phone].    We comply with applicable federal civil rights laws and [place] laws. We do not discriminate on the basis of race, color, national origin, age, disability, sex, sexual orientation, or gender identity.            Thank you!     Thank you for choosing [facility] UROLOGY AND [facility] FOR PROSTATE AND UROLOGIC CANCERS  for your care. Our goal is always to provide you with excellent care. Hearing back from our patients is one way we can continue to improve our services. Please take a few minutes to complete the written survey that you may receive in the mail after your visit with us. Thank you!             Your Updated Medication List - Protect others around you: Learn how to safely use, store and throw away your medicines at " www.disposemymeds.org.          This list is accurate as of: 1/4/18 11:59 PM.  Always use your most recent med list.                   Brand Name Dispense Instructions for use Diagnosis    * amphetamine-dextroamphetamine 20 MG per tablet    ADDERALL     TAKE 2 TABLETS BY MOUTH EVERY MORNING ALONG WITH 30 MG TABLET AT NOON        * amphetamine-dextroamphetamine 30 MG per tablet    ADDERALL     TAKE 1 TABLET BY MOUTH DAILY AT NOON        MULTIVITAMIN & MINERAL PO      Take 1 tablet by mouth        SURBEX-T PO      Take 1 tablet by mouth        * Notice:  This list has 2 medication(s) that are the same as other medications prescribed for you. Read the directions carefully, and ask your doctor or other care provider to review them with you.

## 2018-01-04 NOTE — MR AVS SNAPSHOT
After Visit Summary   1/4/2018    Judd Roberts    MRN: [id number]           Patient Information     Date Of Birth          1985        Visit Information        Provider Department      1/4/2018 1:45 PM Stephen Bhandari MD [facility] EMG        Today's Diagnoses     Paresthesias    -  1    Penile disorder           Follow-ups after your visit        Your next 10 appointments already scheduled     Jan 04, 2018  3:20 PM CST   (Arrive by 3:05 PM)   Return Visit with Juancho Sanchez MD   [facility] Urology and [facility] for Prostate and Urologic Cancers ([facility])    [address]  4th Floor  [place] [address] [phone]            Feb 15, 2018  4:10 PM CST   (Arrive by 3:55 PM)   Return Visit with Armani Smith MD   [facility] Neurology ([facility])    [address]  3rd Floor  [place] [address] [phone]              Future tests that were ordered for you today     Open Future Orders        Priority Expected Expires Ordered    Needle EMG Each Extremity w/Paraspinal Area Complete (01012) Routine  1/4/2019 1/4/2018            Who to contact     Please call your clinic at [phone] to:    Ask questions about your health    Make or cancel appointments    Discuss your medicines    Learn about your test results    Speak to your doctor   If you have compliments or concerns about an experience at your clinic, or if you wish to file a complaint, please contact [facility] Patient Relations at [phone] or email us at [email or web address]         Additional Information About Your Visit        MyChart Information     [employer] gives you secure access to your electronic health record. If you see a primary care provider, you can also send messages to your care team and make appointments. If you have questions, please call your primary care clinic.  If you do not  have a primary care provider, please call [phone] and they will assist you.      [employer] is an electronic gateway that provides easy, online access to your medical records. With [employer], you can request a clinic appointment, read your test results, renew a prescription or communicate with your care team.     To access your existing account, please contact your [facility] or call [phone] for assistance.        Care EveryWhere ID     This is your Care EveryWhere ID. This could be used by other organizations to access your [place] medical records  [id number]         Blood Pressure from Last 3 Encounters:   10/26/17 124/70   02/05/16 136/84    Weight from Last 3 Encounters:   02/05/16 87.1 kg (192 lb)              We Performed the Following     EMG     HC NCS MOTOR W OR W/O F-WAVE, 9 OR 10        Primary Care Provider    None Specified       No primary provider on file.        Equal Access to Services     [facility]: Hadii cale Kang, waaxda [place], qalorenta kaalmada eloise, holly macdonald . So [facility] [phone].    ATENCIÓN: Si habla español, tiene a grimm disposición servicios gratuitos de asistencia lingüística. Llame al [phone].    We comply with applicable federal civil rights laws and [place] laws. We do not discriminate on the basis of race, color, national origin, age, disability, sex, sexual orientation, or gender identity.            Thank you!     Thank you for choosing [facility]  for your care. Our goal is always to provide you with excellent care. Hearing back from our patients is one way we can continue to improve our services. Please take a few minutes to complete the written survey that you may receive in the mail after your visit with us. Thank you!             Your Updated Medication List - Protect others around you: Learn how to safely use, store and throw away your medicines at www.disposemymeds.org.           This list is accurate as of: 1/4/18  2:41 PM.  Always use your most recent med list.                   Brand Name Dispense Instructions for use Diagnosis    * amphetamine-dextroamphetamine 20 MG per tablet    ADDERALL     TAKE 2 TABLETS BY MOUTH EVERY MORNING ALONG WITH 30 MG TABLET AT NOON        * amphetamine-dextroamphetamine 30 MG per tablet    ADDERALL     TAKE 1 TABLET BY MOUTH DAILY AT NOON        MULTIVITAMIN & MINERAL PO      Take 1 tablet by mouth        SURBEX-T PO      Take 1 tablet by mouth        * Notice:  This list has 2 medication(s) that are the same as other medications prescribed for you. Read the directions carefully, and ask your doctor or other care provider to review them with you.

## 2018-01-04 NOTE — DISCHARGE INSTRUCTIONS
MRI Contrast Discharge Instructions    The IV contrast you received today will pass out of your body in your  urine. This will happen in the next 24 hours. You will not feel this process.  Your urine will not change color.    Drink at least 4 extra glasses of water or juice today (unless your doctor  has restricted your fluids). This reduces the stress on your kidneys.  You may take your regular medicines.    If you are on dialysis: It is best to have dialysis today.    If you have a reaction: Most reactions happen right away. If you have  any new symptoms after leaving the hospital (such as hives or swelling),  call your hospital at the correct number below. Or call your family doctor.  If you have breathing distress or wheezing, call 911.    Special instructions: ***    I have read and understand the above information.    Signature:______________________________________ Date:___________    Staff:__________________________________________ Date:___________     Time:__________    [facility] Departments:    ___[place]: [phone]  ___[facility]: [phone]  ___[place]: [phone] ___[facility]: [phone]  ___[facility]: [phone]  ___[facility]: [phone]  ___[phone]  ___[facility]: [phone]  ___[phone]

## 2018-01-12 ENCOUNTER — CARE COORDINATION (OUTPATIENT)
Dept: NEUROLOGY | Facility: CLINIC | Age: 33
End: 2018-01-12

## 2018-01-12 NOTE — PROGRESS NOTES
"Renata Balbuena RN Seeb, Tracey, RN                   Left a vm for Judd to call me back to discuss Dr. Smith's reply.            Previous Messages       ----- Message -----      From: Armani Smith MD      Sent: 1/10/2018   2:09 PM        To: MAYITO Angely - Judd's EMG was completely normal. We can repeat the MRI to capture the 1 remaining sacral level that was missed on his last MRI, but I suspect it will not be informative for his symptoms because his EMG was normal. If he wants to pursue further imaging, though, I would be happy to order another scan.     ----- Message -----      From: Renata Balbuena RN      Sent: 1/10/2018   5:55 AM        To: Armani Smith MD     Judd had his MRI done 1/4 and would like you to take a look at it.  He was told by Uro/prostate that it did not capture areas \"low enough\".  He is wondering if he should repeat MRI.  Please advise.     Thank you!   Renata            1/12/18: spoke with patient regarding Dr Smith's thoughts. He would like to hold off on further imaging at this point. He will discuss at his upcoming appointment 2/14. Patient had no further questions.  "

## 2018-02-14 ENCOUNTER — OFFICE VISIT (OUTPATIENT)
Dept: NEUROLOGY | Facility: CLINIC | Age: 33
End: 2018-02-14

## 2018-02-14 VITALS
WEIGHT: 182 LBS | HEIGHT: 68 IN | BODY MASS INDEX: 27.58 KG/M2 | OXYGEN SATURATION: 96 % | DIASTOLIC BLOOD PRESSURE: 68 MMHG | SYSTOLIC BLOOD PRESSURE: 118 MMHG | HEART RATE: 69 BPM

## 2018-02-14 DIAGNOSIS — N48.9 PENIS SYMPTOM OR SIGN: Primary | ICD-10-CM

## 2018-02-14 NOTE — PROGRESS NOTES
"  DEPARTMENT OF NEUROLOGY  Patient Name: Judd Roberts  MRN: [id number]  : 1985  Date of Clinic Visit: 2018  Primary Care Provider: No primary care provider on file.    FOLLOW-UP FOR: penis numbness    INTERVAL HISTORY:  Judd Roberts is a 32 year old male presenting for follow-up for penis numbness.   I saw Mr. Roberts in 2017.  At that time he was presenting with approximately 2 years of penis numbness primarily in the S3-S4 distributions, worse in the glans with improvement along the shaft.  He endorsed an array of concomitant symptoms such as sensation of weak bladder, weak pelvic floor muscles, and temporary erectile dysfunction.  At that visit we ordered an MRI of the lumbosacral plexus and an EMG of the nerves funded by the lumbosacral roots.  His basic neuropathy labs including LFTs, thyroid panel, vitamin B1/B6/B12/E, and MMA were all normal.   He underwent MRI of the lumbar spine with and without contrast.  Unfortunately, the images cut off at the S2 level and does not include the S3 level which is our primary area of interest.  His EMG did not demonstrate any peripheral neuropathy or radiculopathy in the lumbar or sacral areas.   Interval history: There have been no changes in his genital symptoms.  No new neurologic symptoms.  He denies chest pain, palpitations, issues controlling his bowel,  no changes in skin including atrophy or loss of hair, no flushing. He endorses intermittently sweating excessively at night, endorses frequent orthostatic hypotension.  He says that sometimes his penis will assume a \"mottled\" appearance with some pallor, suggesting a vascular issue.  He denies objective changes in temperature.      ASSESSMENT AND PLAN:  Mr. Roberts is a 32-year-old male following up for penis numbness.  Distribution is primarily the glans.  Previous workup including MRI unfortunately failed to capture the S3 level, but the lumbosacral levels to that points " "were unremarkable.  He endorses associated parasympathetic/sympathetic symptoms such as erectile dysfunction and a weak pump sensation. He feels that his problem was dismissed rather early during his Urology appointment recently, and they did not perform any functional tests.  Given his pump complaints, he needs to undergo urodynamic testing, so we'll send him back to the Urologist. He also endorses few complaints like intermittently excessive sweating as well as orthostatic hypotension and raise some concern for autonomic dysfunction.  Suspect systemic autonomic dysfunction would be very mild and we still do not have a reason why the sacral nerves to be principally affected.  While low on our differential, possibility of vasculitis is raised given his complaints of coloration changes on his shaft. We'd expect some more systemic signs/symptoms if this were vasculitic in nature, but we think it's worth a quick TAMIR screen.    Plan:  - Referral to neurology at [facility] for autonomic testing with Dr. Girard  - Referral to Urology for urodynamic testing  - TAMIR screen  - Return to clinic after autonomic testing    Patient was seen and discussed with Dr. Dietz.    Armani Smith MD  Neurology PGY3  [facility]  Pager: [phone]     I saw and examined this patient, and have read and edited the resident's note.  I agree with the findings, assessment, and plan.    Stephane Dietz  Staff Neurologist   02/15/18           PHYSICAL EXAMINATION:  Vitals: /68  Pulse 69  Ht 1.727 m (5' 8\")  Wt 82.6 kg (182 lb)  SpO2 96%  BMI 27.67 kg/m2  General: NAD  Psych: cooperative, appropriate  Neuro: alert, language is fluent with intact comprehension, no facial asymmetry, no dysarthria, no hypophonia, no abnormal movements, 5/5 strength in all limbs, gait and station normal    INVESTIGATIONS:  MRI Lumbar Spine w/ and w/o contrast 1/4/2018:  Impression:   1. Mild multilevel lumbar degenerative disease, most " prominent being the L4-5 disc degenerative disease where a herniation may abut the left L5 nerve root within the lateral recess and recommend correlation for left L5 radiculopathy symptoms.  2. Visualized lumbar sacral plexus down to the level of S3 appears unremarkable however, penile innervation (given clinical history) can involve S4, which is not covered on imaging., Also, the distal portions of the sacral plexus are not visualized which are better visualized on lumbar sacral plexus imaging.    REVIEW OF SYSTEMS: 12-point RoS negative except as per HPI.    MEDICATIONS:  Current Outpatient Prescriptions   Medication Sig Dispense Refill     amphetamine-dextroamphetamine (ADDERALL) 20 MG per tablet TAKE 2 TABLETS BY MOUTH EVERY MORNING ALONG WITH 30 MG TABLET AT NOON       amphetamine-dextroamphetamine (ADDERALL) 30 MG per tablet TAKE 1 TABLET BY MOUTH DAILY AT NOON       B Complex-C (SURBEX-T PO) Take 1 tablet by mouth       Multiple Vitamins-Minerals (MULTIVITAMIN & MINERAL PO) Take 1 tablet by mouth

## 2018-02-14 NOTE — LETTER
"2018     RE: Judd Roberts  [address] [address] [address]     Dear Colleague,    Thank you for referring your patient, Judd Roberts, to the [facility] NEUROLOGY at [facility]. Please see a copy of my visit note below.      DEPARTMENT OF NEUROLOGY  Patient Name: Judd Roberts  MRN: [id number]  : 1985  Date of Clinic Visit: 2018  Primary Care Provider: No primary care provider on file.    FOLLOW-UP FOR: penis numbness    INTERVAL HISTORY:  Judd Roberts is a 32 year old male presenting for follow-up for penis numbness.   I saw Mr. Roberts in 2017.  At that time he was presenting with approximately 2 years of penis numbness primarily in the S3-S4 distributions, worse in the glans with improvement along the shaft.  He endorsed an array of concomitant symptoms such as sensation of weak bladder, weak pelvic floor muscles, and temporary erectile dysfunction.  At that visit we ordered an MRI of the lumbosacral plexus and an EMG of the nerves funded by the lumbosacral roots.  His basic neuropathy labs including LFTs, thyroid panel, vitamin B1/B6/B12/E, and MMA were all normal.   He underwent MRI of the lumbar spine with and without contrast.  Unfortunately, the images cut off at the S2 level and does not include the S3 level which is our primary area of interest.  His EMG did not demonstrate any peripheral neuropathy or radiculopathy in the lumbar or sacral areas.   Interval history: There have been no changes in his genital symptoms.  No new neurologic symptoms.  He denies chest pain, palpitations, issues controlling his bowel,  no changes in skin including atrophy or loss of hair, no flushing. He endorses intermittently sweating excessively at night, endorses frequent orthostatic hypotension.  He says that sometimes his penis will assume a \"mottled\" appearance with some pallor, suggesting a vascular issue.  He denies " "objective changes in temperature.      ASSESSMENT AND PLAN:  Mr. Roberts is a 32-year-old male following up for penis numbness.  Distribution is primarily the glans.  Previous workup including MRI unfortunately failed to capture the S3 level, but the lumbosacral levels to that points were unremarkable.  He endorses associated parasympathetic/sympathetic symptoms such as erectile dysfunction and a weak pump sensation. He feels that his problem was dismissed rather early during his Urology appointment recently, and they did not perform any functional tests.  Given his pump complaints, he needs to undergo urodynamic testing, so we'll send him back to the Urologist. He also endorses few complaints like intermittently excessive sweating as well as orthostatic hypotension and raise some concern for autonomic dysfunction.  Suspect systemic autonomic dysfunction would be very mild and we still do not have a reason why the sacral nerves to be principally affected.  While low on our differential, possibility of vasculitis is raised given his complaints of coloration changes on his shaft. We'd expect some more systemic signs/symptoms if this were vasculitic in nature, but we think it's worth a quick TAMIR screen.    Plan:  - [place] [facility] [place] with Dr. Girard  - Referral to Urology for urodynamic testing  - TAMIR screen  - Return to clinic after autonomic testing    Patient was seen and discussed with Dr. Dietz.    Armani Smith MD  Neurology PGY3  [facility]  Pager: [phone]     I saw and examined this patient, and have read and edited the resident's note.  I agree with the findings, assessment, and plan.    Stephane Dietz  Staff Neurologist   02/15/18           PHYSICAL EXAMINATION:  Vitals: /68  Pulse 69  Ht 1.727 m (5' 8\")  Wt 82.6 kg (182 lb)  SpO2 96%  BMI 27.67 kg/m2  General: NAD  Psych: cooperative, appropriate  Neuro: alert, language is fluent " with intact comprehension, no facial asymmetry, no dysarthria, no hypophonia, no abnormal movements, 5/5 strength in all limbs, gait and station normal    INVESTIGATIONS:  MRI Lumbar Spine w/ and w/o contrast 1/4/2018:  Impression:   1. Mild multilevel lumbar degenerative disease, most prominent being the L4-5 disc degenerative disease where a herniation may abut the left L5 nerve root within the lateral recess and recommend correlation for left L5 radiculopathy symptoms.  2. Visualized lumbar sacral plexus down to the level of S3 appears unremarkable however, penile innervation (given clinical history) can involve S4, which is not covered on imaging., Also, the distal portions of the sacral plexus are not visualized which are better visualized on lumbar sacral plexus imaging.    REVIEW OF SYSTEMS: 12-point RoS negative except as per HPI.    MEDICATIONS:  Current Outpatient Prescriptions   Medication Sig Dispense Refill     amphetamine-dextroamphetamine (ADDERALL) 20 MG per tablet TAKE 2 TABLETS BY MOUTH EVERY MORNING ALONG WITH 30 MG TABLET AT NOON       amphetamine-dextroamphetamine (ADDERALL) 30 MG per tablet TAKE 1 TABLET BY MOUTH DAILY AT NOON       B Complex-C (SURBEX-T PO) Take 1 tablet by mouth       Multiple Vitamins-Minerals (MULTIVITAMIN & MINERAL PO) Take 1 tablet by mouth       Again, thank you for allowing me to participate in the care of your patient.      Sincerely,    Armani Smith MD

## 2018-02-14 NOTE — MR AVS SNAPSHOT
After Visit Summary   2018    Judd Roberts    MRN: [id number]           Patient Information     Date Of Birth          1985        Visit Information        Provider Department      2018 4:10 PM Armani Smtih MD [facility] Neurology        Today's Diagnoses     Penis symptom or sign    -  1       Follow-ups after your visit        Additional Services     NEUROLOGY ADULT REFERRAL       Referral to [facility] with Dr. Girard for autonomic testing.  Patient name: Judd Roberts  : 1985  Patient history: Patient has penile numbness with weak ejaculation and micturation, and he had transient erectile dysfunction in the past. EMG of the peripheral nerves of the lumbosacral plexus was normal, as were basic blood tests. MRI of the Lumbar spine only went to S2, did not capture S3, but there were no structural abnormalities. He reports some excessive sweating and frequent orthostasis, so we'd like to assess for a possible autonomic dysfunction that could be underlying his genital issues.  Please call the patient to schedule.  Patient phone: [phone]    Please be aware that coverage of these services is subject to the terms and limitations of your health insurance plan.  Call member services at your health plan with any benefit or coverage questions.      Please bring the following with you to your appointment:    (1) Any X-Rays, CTs or MRIs which have been performed.  Contact the facility where they were done to arrange for  prior to your scheduled appointment.    (2) List of current medications  (3) This referral request   (4) Any documents/labs given to you for this referral            UROLOGY ADULT REFERRAL       Referral to Urology for URODYNAMIC TESTING.    Please be aware that coverage of these services is subject to the terms and limitations of your health insurance plan.  Call member services at your health plan with any  benefit or coverage questions.      Please bring the following with you to your appointment:    (1) Any X-Rays, CTs or MRIs which have been performed.  Contact the facility where they were done to arrange for  prior to your scheduled appointment.    (2) List of current medications  (3) This referral request   (4) Any documents/labs given to you for this referral                  Your next 10 appointments already scheduled     May 10, 2018 11:00 AM CDT   (Arrive by 10:45 AM)   Urodynamics with Erma Singh PA-C   [facility] ([facility] [place])    [address]  [address]th [address]  [facility] [place] [address] [phone]            May 10, 2018  3:40 PM CDT   (Arrive by 3:25 PM)   Return Visit with Armani Smith MD   [facility]rology ([facility])    [address]  [address] Floor  [place] [address] [phone]              Who to contact     Please call your clinic at [phone] to:    Ask questions about your health    Make or cancel appointments    Discuss your medicines    Learn about your test results    Speak to your doctor            Additional Information About Your Visit        [facility] Information     [facility] gives you secure access to your electronic health record. If you see a primary care provider, you can also send messages to your care team and make appointments. If you have questions, please call your primary care clinic.  If you do not have a primary care provider, please call [phone] and they will assist you.      [facility] is an electronic gateway that provides easy, online access to your medical records. With [facility], you can request a clinic appointment, read your test results, renew a prescription or communicate with your care team.     To access your existing account, please contact your [facility] or call [phone] for assistance.       "  Care EveryWhere ID     This is your Care EveryWhere ID. This could be used by other organizations to access your [facility] medical records  [id number]        Your Vitals Were     Pulse Height Pulse Oximetry BMI (Body Mass Index)          69 1.727 m (5' 8\") 96% 27.67 kg/m2         Blood Pressure from Last 3 Encounters:   02/14/18 118/68   01/04/18 129/70   10/26/17 124/70    Weight from Last 3 Encounters:   02/14/18 82.6 kg (182 lb)   01/04/18 79.4 kg (175 lb)   02/05/16 87.1 kg (192 lb)              We Performed the Following     TAMIR Screen I[facility] ([facility])     NEUROLOGY ADULT REFERRAL     UROLOGY ADULT REFERRAL        Primary Care Provider    None Specified       No primary provider on file.        Equal Access to Services     RAFITA PORRAS : Haddante Kang, waaxda luqadaha, qaybta kaalmada romeliayadeanna, holly macdonald . So [facility] [phone].    ATENCIÓN: Si habla español, tiene a grimm disposición servicios gratuitos de asistencia lingüística. Llame al [phone].    We comply with applicable federal civil rights laws and [place] laws. We do not discriminate on the basis of race, color, national origin, age, disability, sex, sexual orientation, or gender identity.            Thank you!     Thank you for choosing [facility] NEUROLOGY  for your care. Our goal is always to provide you with excellent care. Hearing back from our patients is one way we can continue to improve our services. Please take a few minutes to complete the written survey that you may receive in the mail after your visit with us. Thank you!             Your Updated Medication List - Protect others around you: Learn how to safely use, store and throw away your medicines at www.disposemymeds.org.          This list is accurate as of 2/14/18  5:21 PM.  Always use your most recent med list.                   Brand Name Dispense Instructions for use Diagnosis    * amphetamine-dextroamphetamine 20 MG per tablet    " ADDERALL     TAKE 2 TABLETS BY MOUTH EVERY MORNING ALONG WITH 30 MG TABLET AT NOON        * amphetamine-dextroamphetamine 30 MG per tablet    ADDERALL     TAKE 1 TABLET BY MOUTH DAILY AT NOON        MULTIVITAMIN & MINERAL PO      Take 1 tablet by mouth        SURBEX-T PO      Take 1 tablet by mouth        * Notice:  This list has 2 medication(s) that are the same as other medications prescribed for you. Read the directions carefully, and ask your doctor or other care provider to review them with you.

## 2018-02-14 NOTE — PATIENT INSTRUCTIONS
1. Make another Urology appointment for urodynamic testing.  2. Referral to [facility] Neurology clinic for autonomic testing with Dr. Girard.  3. Blood test to check TAMIR for possible vasculitis.  4. Return to Dr. Smith's clinic after autonomic testing is complete.

## 2018-02-15 LAB
ANA PAT SER IF-IMP: ABNORMAL
ANA SER QL IF: ABNORMAL
ANA TITR SER IF: ABNORMAL {TITER}

## 2018-04-19 ENCOUNTER — PRE VISIT (OUTPATIENT)
Dept: UROLOGY | Facility: CLINIC | Age: 33
End: 2018-04-19

## 2020-03-10 ENCOUNTER — HEALTH MAINTENANCE LETTER (OUTPATIENT)
Age: 35
End: 2020-03-10

## 2020-12-27 ENCOUNTER — HEALTH MAINTENANCE LETTER (OUTPATIENT)
Age: 35
End: 2020-12-27

## 2021-04-24 ENCOUNTER — HEALTH MAINTENANCE LETTER (OUTPATIENT)
Age: 36
End: 2021-04-24

## 2021-10-04 ENCOUNTER — HEALTH MAINTENANCE LETTER (OUTPATIENT)
Age: 36
End: 2021-10-04

## 2022-05-15 ENCOUNTER — HEALTH MAINTENANCE LETTER (OUTPATIENT)
Age: 37
End: 2022-05-15

## 2022-09-11 ENCOUNTER — HEALTH MAINTENANCE LETTER (OUTPATIENT)
Age: 37
End: 2022-09-11

## 2023-06-03 ENCOUNTER — HEALTH MAINTENANCE LETTER (OUTPATIENT)
Age: 38
End: 2023-06-03